# Patient Record
Sex: MALE | URBAN - METROPOLITAN AREA
[De-identification: names, ages, dates, MRNs, and addresses within clinical notes are randomized per-mention and may not be internally consistent; named-entity substitution may affect disease eponyms.]

---

## 2024-09-12 ENCOUNTER — APPOINTMENT (RX ONLY)
Dept: URBAN - METROPOLITAN AREA CLINIC 95 | Facility: CLINIC | Age: 29
Setting detail: DERMATOLOGY
End: 2024-09-12

## 2024-09-12 DIAGNOSIS — D17 BENIGN LIPOMATOUS NEOPLASM: ICD-10-CM

## 2024-09-12 DIAGNOSIS — L81.0 POSTINFLAMMATORY HYPERPIGMENTATION: ICD-10-CM

## 2024-09-12 PROBLEM — D17.0 BENIGN LIPOMATOUS NEOPLASM OF SKIN AND SUBCUTANEOUS TISSUE OF HEAD, FACE AND NECK: Status: ACTIVE | Noted: 2024-09-12

## 2024-09-12 PROCEDURE — ? DEFER

## 2024-09-12 PROCEDURE — ? COUNSELING

## 2024-09-12 PROCEDURE — ? PHOTO-DOCUMENTATION

## 2024-09-12 PROCEDURE — 99203 OFFICE O/P NEW LOW 30 MIN: CPT

## 2024-09-12 PROCEDURE — ? ORDER ULTRASOUND

## 2024-09-12 PROCEDURE — ? OBSERVATION AND MEASURE

## 2024-09-12 ASSESSMENT — LOCATION SIMPLE DESCRIPTION DERM
LOCATION SIMPLE: LEFT FOREHEAD
LOCATION SIMPLE: LEFT ANTERIOR NECK
LOCATION SIMPLE: RIGHT ANTERIOR NECK

## 2024-09-12 ASSESSMENT — LOCATION ZONE DERM
LOCATION ZONE: NECK
LOCATION ZONE: FACE

## 2024-09-12 ASSESSMENT — LOCATION DETAILED DESCRIPTION DERM
LOCATION DETAILED: LEFT SUPERIOR MEDIAL FOREHEAD
LOCATION DETAILED: LEFT CLAVICULAR NECK
LOCATION DETAILED: RIGHT INFERIOR LATERAL NECK

## 2024-09-12 NOTE — PROCEDURE: OBSERVATION
Left message on machine to call back.  Ask to speak to any triage nurse, let them know it's a return call.    Leave a number and time that you can be reached.   Ana Dangelo RN  [facility]  
Detail Level: Detailed
Size Of Lesion: 2 cm

## 2024-09-12 NOTE — PROCEDURE: ORDER ULTRASOUND
Priority: normal
Time Frame Number: 1
Provider: Tessa Trinidad Dr
Time Frame Unit: year(s)
Lesion Location: mid forehead
Ultrasound Protocol: Ultrasound of Subcutaneous Mass
Detail Level: Simple
Subcutaneous Lesion Ultrasound Reason: lipoma - please eval if lesion is above or below muscles.

## 2024-09-12 NOTE — PROCEDURE: DEFER
Detail Level: Detailed
Size Of Lesion In Cm (Optional): 2
X Size Of Lesion In Cm (Optional): 0
Procedure To Be Performed At Next Visit: Excision
Introduction Text (Please End With A Colon): The following procedure was deferred with Dr. Bianchi: Patient will need ultrasound first to determine depth of lipoma to frontalis muscle. I called the patient myself and told him this over the phone, task sent to have the ultrasound referral emailed to the patient.